# Patient Record
Sex: FEMALE | Race: WHITE | HISPANIC OR LATINO | Employment: FULL TIME | URBAN - METROPOLITAN AREA
[De-identification: names, ages, dates, MRNs, and addresses within clinical notes are randomized per-mention and may not be internally consistent; named-entity substitution may affect disease eponyms.]

---

## 2020-09-29 ENCOUNTER — TELEPHONE (OUTPATIENT)
Dept: SCHEDULING | Facility: IMAGING CENTER | Age: 25
End: 2020-09-29

## 2020-10-07 ENCOUNTER — OFFICE VISIT (OUTPATIENT)
Dept: MEDICAL GROUP | Facility: IMAGING CENTER | Age: 25
End: 2020-10-07
Payer: COMMERCIAL

## 2020-10-07 VITALS
SYSTOLIC BLOOD PRESSURE: 116 MMHG | DIASTOLIC BLOOD PRESSURE: 72 MMHG | TEMPERATURE: 98.3 F | WEIGHT: 153 LBS | OXYGEN SATURATION: 98 % | HEART RATE: 78 BPM | BODY MASS INDEX: 24.59 KG/M2 | RESPIRATION RATE: 12 BRPM | HEIGHT: 66 IN

## 2020-10-07 DIAGNOSIS — Z30.431 ENCOUNTER FOR ROUTINE CHECKING OF INTRAUTERINE CONTRACEPTIVE DEVICE (IUD): ICD-10-CM

## 2020-10-07 DIAGNOSIS — L65.9 HAIR LOSS: ICD-10-CM

## 2020-10-07 DIAGNOSIS — Z13.220 SCREENING CHOLESTEROL LEVEL: ICD-10-CM

## 2020-10-07 PROCEDURE — 99204 OFFICE O/P NEW MOD 45 MIN: CPT | Performed by: FAMILY MEDICINE

## 2020-10-07 SDOH — HEALTH STABILITY: MENTAL HEALTH: HOW OFTEN DO YOU HAVE A DRINK CONTAINING ALCOHOL?: 2-4 TIMES A MONTH

## 2020-10-07 ASSESSMENT — ENCOUNTER SYMPTOMS
WHEEZING: 0
PALPITATIONS: 0
VOMITING: 0
ABDOMINAL PAIN: 0
MYALGIAS: 0
NAUSEA: 0
COUGH: 0
DIARRHEA: 0
SHORTNESS OF BREATH: 0
EYE PAIN: 0
HEADACHES: 0
DIZZINESS: 0
DOUBLE VISION: 0
FEVER: 0
CHILLS: 0

## 2020-10-07 ASSESSMENT — PATIENT HEALTH QUESTIONNAIRE - PHQ9: CLINICAL INTERPRETATION OF PHQ2 SCORE: 0

## 2020-10-07 ASSESSMENT — PAIN SCALES - GENERAL: PAINLEVEL: NO PAIN

## 2020-10-07 NOTE — PROGRESS NOTES
"Chief Complaint   Patient presents with   • Establish Care   • Hair Loss     over many years     HPI:  25 year old here to discuss hair loss over many years since about 19 years old. She stopped birth control and has an iud. Feels that it is hormonal.   Has been to a dermatologist. Who said that she was in a normal cycle of hair loss though is concerned because she keeps loosing hair. She says in the shower and when driving she notices most. She has kept her hair long half way down the back for years.    Thyroid disorder in mom. No surgeries. No family history of hair thinning.   She has heavy periods. Last 4-5 days with two heavy days. She had awa on birth control oral since she was 13 years old. Then switched to a different one at 19 years old. Then stopped birth control at 23 years old. She was told in [place] that she is not ovulating. She was not concerned at the time because she does not want to have kids. No fevers chills. Or pulling the hair out.   No Known Allergies  No current outpatient medications on file.     No current facility-administered medications for this visit.      Social History     Tobacco Use   • Smoking status: Never Smoker   • Smokeless tobacco: Never Used   Substance Use Topics   • Alcohol use: Yes     Frequency: 2-4 times a month   • Drug use: Never     Family History   Problem Relation Age of Onset   • Thyroid Mother    • Heart Disease Mother    • Hypertension Father    • Diabetes Father        /72   Pulse 78   Temp 36.8 °C (98.3 °F)   Resp 12   Ht 1.68 m (5' 6.14\")   Wt 69.4 kg (153 lb)   SpO2 98%  Body mass index is 24.59 kg/m².  Review of Systems   Constitutional: Negative for chills, fever and malaise/fatigue.   HENT: Negative for hearing loss and tinnitus.    Eyes: Negative for double vision and pain.   Respiratory: Negative for cough, shortness of breath and wheezing.    Cardiovascular: Negative for chest pain, palpitations and leg swelling.   Gastrointestinal: Negative " for abdominal pain, diarrhea, nausea and vomiting.   Genitourinary: Negative for dysuria and frequency.   Musculoskeletal: Negative for joint pain and myalgias.   Skin: Negative for itching and rash.   Neurological: Negative for dizziness and headaches.     Physical Exam   Constitutional: She is oriented to person, place, and time and well-developed, well-nourished, and in no distress. No distress.   HENT:   Head: Normocephalic and atraumatic.   Hair and scalp exam reveals thick hair no lesions    Eyes: Pupils are equal, round, and reactive to light. Conjunctivae are normal. Right eye exhibits no discharge. Left eye exhibits no discharge. No scleral icterus.   Cardiovascular: Normal rate, regular rhythm and normal heart sounds. Exam reveals no gallop and no friction rub.   No murmur heard.  Pulmonary/Chest: Effort normal and breath sounds normal. No respiratory distress. She has no wheezes. She has no rales.   Musculoskeletal:         General: No edema.   Neurological: She is alert and oriented to person, place, and time.   Skin: Skin is warm and dry. She is not diaphoretic.         All labs (last 1 month):   No visits with results within 1 Month(s) from this visit.   Latest known visit with results is:   No results found for any previous visit.       Lipids: No results found for: CHOLSTRLTOT, TRIGLYCERIDE, HDL, LDL    Imaging: No results found.    A/P  I believe she was on a birth control with higher estrogen thus making her hair thicker at one point which naturally thinned back to its original thickness which is still thick. Then she became hyper focused on hair loss noticing what sounds like a normal amount of hair loss for a women with long hair. Reassurance provided.   Return for annual.    Problem List Items Addressed This Visit     None      Visit Diagnoses     Hair loss        Relevant Orders    CBC WITH DIFFERENTIAL    Comp Metabolic Panel    TSH WITH REFLEX TO FT4    Screening cholesterol level         Relevant Orders    Lipid Profile    Encounter for routine checking of intrauterine contraceptive device (IUD)        Relevant Orders    REFERRAL TO OB/GYN          Portions of this note may be dictated using Dragon NaturallySpeaking voice recognition software.  Variances in spelling and vocabulary are possible and unintentional.  Not all areas may be caught/corrected.  Please notify me if any discrepancies are noted or if the meaning of any statement is not correct/clear.

## 2020-10-19 ENCOUNTER — OFFICE VISIT (OUTPATIENT)
Dept: MEDICAL GROUP | Facility: IMAGING CENTER | Age: 25
End: 2020-10-19
Payer: COMMERCIAL

## 2020-10-19 VITALS
DIASTOLIC BLOOD PRESSURE: 60 MMHG | HEIGHT: 66 IN | BODY MASS INDEX: 24.59 KG/M2 | OXYGEN SATURATION: 99 % | SYSTOLIC BLOOD PRESSURE: 108 MMHG | HEART RATE: 78 BPM | WEIGHT: 153 LBS | RESPIRATION RATE: 12 BRPM | TEMPERATURE: 99 F

## 2020-10-19 DIAGNOSIS — Z20.2 STD EXPOSURE: ICD-10-CM

## 2020-10-19 DIAGNOSIS — H60.62 CHRONIC OTITIS EXTERNA OF LEFT EAR, UNSPECIFIED TYPE: ICD-10-CM

## 2020-10-19 DIAGNOSIS — Z00.00 ANNUAL PHYSICAL EXAM: ICD-10-CM

## 2020-10-19 PROCEDURE — 99395 PREV VISIT EST AGE 18-39: CPT | Performed by: FAMILY MEDICINE

## 2020-10-19 RX ORDER — CIPROFLOXACIN AND DEXAMETHASONE 3; 1 MG/ML; MG/ML
4 SUSPENSION/ DROPS AURICULAR (OTIC) 2 TIMES DAILY
Qty: 4 ML | Refills: 0 | Status: SHIPPED | OUTPATIENT
Start: 2020-10-19 | End: 2020-10-26

## 2020-10-19 ASSESSMENT — PAIN SCALES - GENERAL: PAINLEVEL: NO PAIN

## 2020-10-19 NOTE — PROGRESS NOTES
Subjective:     CC:   Chief Complaint   Patient presents with   • Annual Exam       HPI:   Katelyn Ling is a 25 y.o. female who presents for annual exam. She is feeling well and denies any complaints.    Ob-Gyn/ History:    Patient has GYN provider: yes    Health Maintenance  Diet: healthy  Exercise: active  Substance Abuse: no ilicit drinks 2 times per month two glasses each time  No concerning moles    Cancer screening  Colorectal Cancer Screening: none in family  Lung Cancer Screening: none in family  Breast Cancer Screening: 3 cousins of her mom with breast cancer in their 30s or 40s.  Pt prefers to do monthly at home exams. Discussed how to do this. What to look for and how to differentiate a mass from normal breast tissue. She will do breast and pelvic exam with her gyn    Infectious disease screening/Immunizations  -she would like to get tested for stds  --Immunizations:    She says she is up to date mostly but refuses flu    She  has no past medical history on file.  She  has no past surgical history on file.    Family History   Problem Relation Age of Onset   • Thyroid Mother    • Heart Disease Mother    • Hypertension Father    • Diabetes Father        Social History     Socioeconomic History   • Marital status:      Spouse name: Not on file   • Number of children: Not on file   • Years of education: Not on file   • Highest education level: Not on file   Occupational History   • Not on file   Social Needs   • Financial resource strain: Not on file   • Food insecurity     Worry: Not on file     Inability: Not on file   • Transportation needs     Medical: Not on file     Non-medical: Not on file   Tobacco Use   • Smoking status: Never Smoker   • Smokeless tobacco: Never Used   Substance and Sexual Activity   • Alcohol use: Yes     Frequency: 2-4 times a month   • Drug use: Never   • Sexual activity: Yes     Partners: Male     Birth control/protection: I.U.D.   Lifestyle   • Physical activity      "Days per week: Not on file     Minutes per session: Not on file   • Stress: Not on file   Relationships   • Social connections     Talks on phone: Not on file     Gets together: Not on file     Attends Hinduism service: Not on file     Active member of club or organization: Not on file     Attends meetings of clubs or organizations: Not on file     Relationship status: Not on file   • Intimate partner violence     Fear of current or ex partner: Not on file     Emotionally abused: Not on file     Physically abused: Not on file     Forced sexual activity: Not on file   Other Topics Concern   • Not on file   Social History Narrative   • Not on file       There are no active problems to display for this patient.        No current outpatient medications on file.     No current facility-administered medications for this visit.      No Known Allergies    Review of Systems   Constitutional: Negative for fever, chills, unexplained weight loss, night sweats  HENT: Negative for congestion.    Eyes: Negative for pain or sudden vision changes   Respiratory: Negative for cough and shortness of breath.    Cardiovascular: Negative for leg swelling or chest pain  Gastrointestinal: Negative for nausea, vomiting, abdominal pain and diarrhea.   Genitourinary: Negative for dysuria and hematuria.   Skin: Negative for rash or concerning moles   Neurological: Negative for dizziness, focal weakness and headaches.   Psychiatric/Behavioral: Negative for depression.  The patient is not nervous/anxious.      Objective:     /60   Pulse 78   Temp 37.2 °C (99 °F)   Resp 12   Ht 1.68 m (5' 6.14\")   Wt 69.4 kg (153 lb)   LMP 09/25/2020 (Exact Date) Comment: IUD  SpO2 99%   BMI 24.59 kg/m²   Body mass index is 24.59 kg/m².  Wt Readings from Last 4 Encounters:   10/19/20 69.4 kg (153 lb)   10/07/20 69.4 kg (153 lb)       Physical Exam:  Constitutional: Well-developed and well-nourished. Not diaphoretic. No distress.   Skin: Skin is warm " and dry. No rash noted.  Head: Atraumatic without lesions. Hair pull test is negative. Scalp with mild flaking. No erythema or lesions. No adenopathy of the head or neck. Thyroid without nodules and normal size.   Eyes: Conjunctivae and extraocular motions are normal. Pupils are equal, round, and reactive to light and accomodation. No scleral icterus.   Ears:  External ears unremarkable b/l. Tympanic membranes clear and intact b/l. There is a very short hair in right ear canal (patient says she can feel this). Left ear canal without discharge though the skin at the entrance is red and tender.   Mouth/Throat: deferred during covid 19 pandemic asked patient if any concerns lesions or questions  Neck: Supple, trachea midline. Normal range of motion. No thyromegaly present. No lymphadenopathy--cervical or supraclavicular.  Cardiovascular: Regular rate and rhythm, S1 and S2 without murmur, rubs, or gallops.  Lungs: Normal inspiratory effort, CTA bilaterally, no wheezes/rhonchi/rales  Breast: defers to gyn  Abdomen: Soft, non tender, and without distention. Active normal bowel sounds. No rebound, guarding, masses or HSM.  : defer to gyn  Extremities: No cyanosis, clubbing, erythema, nor edema. Distal pulses intact and symmetric.   Musculoskeletal: All major joints AROM full in all directions without pain.  Neurological: Alert and oriented x 3.  Psychiatric:  Behavior, mood, and affect are appropriate.      Assessment and Plan:   -annual Labs ordered at prior visit.   -Possible that she can feel the hair in left ear canal though also her cerumen is soft and liquid like which I believe can cause a tickle sensation in the ear as well.   -right ear is tender with very mild erythema just at the entrance of the ear canal with no discharge or other changes.     No problems updated.  Problem List Items Addressed This Visit     None      Visit Diagnoses     STD exposure        Relevant Orders    Chlamydia/GC PCR Urine Or Swab     HEP C VIRUS ANTIBODY    HIV AG/AB COMBO ASSAY SCREENING    T.PALLIDUM AB EIA    Chronic otitis externa of left ear, unspecified type        Relevant Medications    ciprofloxacin/dexamethasone (CIPRODEX) 0.3-0.1 % Suspension    Annual physical exam              Follow-up: PRN concerns and for annual screenings once per year    -Smoking cessation discussed if smoking and encouraged to come to office if quit and tempted or restarts smoking if pertinent to this patient. We discourage use of electronic smoking devises.   -Discussed healthy drinking habits if over 7 for females, 14 for males per week or more than 4 in one day.  -Discussed healthy eating habits, exercise, being physically active, healthy bmi below 25  -patient to provide ages of family members when they were diagnosed with cancer , especially breast and ovarian, pancreatic cancers.  -Reviewed pap history in female patients, if they have a gynecologist they are encouraged to follow up with that doctor for annual pelvic and breast exams. If they prefer to see me for women's health, I will perform pap smear with hpv dna testing, pelvic, and breast exam, these and male genital exams are always done in the presence of a medical assistant and with verbal permission from the patient.   -Colonoscopy history reviewed with those over 46yo or those with early family h/o colon cancer. If patient is due we provide them with various colon cancer screen modalities and relevant information to help the patient decide which is best for them.   -Mammograms recommended yearly for women over 41yo. Risks and benefits are reviewed and discussed with the patient and mammogram script provided.   -PSA discussed with males with family history of prostate cancer or those concerned. The decision to order this test is made with the patient.   -If patient prescribed medicines then told to review package insert for any warnings, side effects, contra-indications and medication vs  medication reactions.   -STD testing added to lab work due to patients age per guideline recommendations  -Patients screened for anxiety and depression. If positive screening patients are offered behavioral health services, medications, and tools to improve mood.   -to improve bone health take calcium and vitamin D, perform weight-bearing exercise, in addition we can discuss additional medications if needed including bisphosphonates, parathyroid hormone, and raloxifene.  Esophageal irritation can occur with bisphosphonate therapy this can be reduced by not laying down for 30 min after taking and taking with a full glass of water  -if wearing nail polish on toes or hands asked to rto if there are any dark brown or black areas under the nails  If lab tests ordered, then patient instructed to go to lab/location/plan  If imaging tests ordered, then patient instructed to go to radiology/location/plan  If medicines ordered, then patient instructed to go to pharmacy/location/plan  Health maintenance I reviewed both men and women's health maintenance  Leading causes of death are motor vehicle accidents, cardiovascular disease, malignant tumors, and HIV.   Breast and ovarian cancer mutation screening was suggested if there was an increased risk for the patient based on jay scoring.   -A general visit to see the eye doctor every one to two years was thought appropriate. Dentist ever 6-12 months.  -Immunization suggestions: Tetanus shot every 10 years, Influenza immunization pneumococcal- anyone with chronic illnesses   No

## 2020-11-21 LAB
ALBUMIN SERPL-MCNC: 5.1 G/DL (ref 3.9–5)
ALBUMIN/GLOB SERPL: 2.1 {RATIO} (ref 1.2–2.2)
ALP SERPL-CCNC: 57 IU/L (ref 39–117)
ALT SERPL-CCNC: 13 IU/L (ref 0–32)
AST SERPL-CCNC: 16 IU/L (ref 0–40)
BASOPHILS # BLD AUTO: 0.1 X10E3/UL (ref 0–0.2)
BASOPHILS NFR BLD AUTO: 1 %
BILIRUB SERPL-MCNC: 2.3 MG/DL (ref 0–1.2)
BUN SERPL-MCNC: 11 MG/DL (ref 6–20)
BUN/CREAT SERPL: 14 (ref 9–23)
C TRACH RRNA SPEC QL NAA+PROBE: NEGATIVE
CALCIUM SERPL-MCNC: 9.8 MG/DL (ref 8.7–10.2)
CHLORIDE SERPL-SCNC: 103 MMOL/L (ref 96–106)
CHOLEST SERPL-MCNC: 189 MG/DL (ref 100–199)
CO2 SERPL-SCNC: 20 MMOL/L (ref 20–29)
CREAT SERPL-MCNC: 0.78 MG/DL (ref 0.57–1)
EOSINOPHIL # BLD AUTO: 0.1 X10E3/UL (ref 0–0.4)
EOSINOPHIL NFR BLD AUTO: 1 %
ERYTHROCYTE [DISTWIDTH] IN BLOOD BY AUTOMATED COUNT: 11.7 % (ref 11.7–15.4)
GLOBULIN SER CALC-MCNC: 2.4 G/DL (ref 1.5–4.5)
GLUCOSE SERPL-MCNC: 97 MG/DL (ref 65–99)
HCT VFR BLD AUTO: 42.8 % (ref 34–46.6)
HCV AB S/CO SERPL IA: <0.1 S/CO RATIO (ref 0–0.9)
HDLC SERPL-MCNC: 66 MG/DL
HGB BLD-MCNC: 14.1 G/DL (ref 11.1–15.9)
HIV 1+2 AB+HIV1 P24 AG SERPL QL IA: NON REACTIVE
IMM GRANULOCYTES # BLD AUTO: 0 X10E3/UL (ref 0–0.1)
IMM GRANULOCYTES NFR BLD AUTO: 0 %
IMMATURE CELLS  115398: NORMAL
LABORATORY COMMENT REPORT: ABNORMAL
LDLC SERPL CALC-MCNC: 113 MG/DL (ref 0–99)
LYMPHOCYTES # BLD AUTO: 1.7 X10E3/UL (ref 0.7–3.1)
LYMPHOCYTES NFR BLD AUTO: 23 %
MCH RBC QN AUTO: 31.3 PG (ref 26.6–33)
MCHC RBC AUTO-ENTMCNC: 32.9 G/DL (ref 31.5–35.7)
MCV RBC AUTO: 95 FL (ref 79–97)
MONOCYTES # BLD AUTO: 0.6 X10E3/UL (ref 0.1–0.9)
MONOCYTES NFR BLD AUTO: 8 %
MORPHOLOGY BLD-IMP: NORMAL
N GONORRHOEA RRNA SPEC QL NAA+PROBE: NEGATIVE
NEUTROPHILS # BLD AUTO: 5 X10E3/UL (ref 1.4–7)
NEUTROPHILS NFR BLD AUTO: 67 %
NRBC BLD AUTO-RTO: NORMAL %
PLATELET # BLD AUTO: 353 X10E3/UL (ref 150–450)
POTASSIUM SERPL-SCNC: 4 MMOL/L (ref 3.5–5.2)
PROT SERPL-MCNC: 7.5 G/DL (ref 6–8.5)
RBC # BLD AUTO: 4.5 X10E6/UL (ref 3.77–5.28)
SODIUM SERPL-SCNC: 139 MMOL/L (ref 134–144)
T4 FREE SERPL-MCNC: 1.43 NG/DL (ref 0.82–1.77)
TREPONEMA PALLIDUM IGG+IGM AB [PRESENCE] IN SERUM OR PLASMA BY IMMUNOASSAY: NON REACTIVE
TRIGL SERPL-MCNC: 52 MG/DL (ref 0–149)
TSH SERPL DL<=0.005 MIU/L-ACNC: 4.65 UIU/ML (ref 0.45–4.5)
VLDLC SERPL CALC-MCNC: 10 MG/DL (ref 5–40)
WBC # BLD AUTO: 7.5 X10E3/UL (ref 3.4–10.8)

## 2020-11-30 ENCOUNTER — TELEMEDICINE (OUTPATIENT)
Dept: MEDICAL GROUP | Facility: IMAGING CENTER | Age: 25
End: 2020-11-30
Payer: COMMERCIAL

## 2020-11-30 VITALS — BODY MASS INDEX: 24.59 KG/M2 | HEIGHT: 66 IN | WEIGHT: 153 LBS

## 2020-11-30 DIAGNOSIS — E80.4 GILBERT'S DISEASE: ICD-10-CM

## 2020-11-30 DIAGNOSIS — E03.9 ACQUIRED HYPOTHYROIDISM: ICD-10-CM

## 2020-11-30 PROCEDURE — 99213 OFFICE O/P EST LOW 20 MIN: CPT | Mod: 95,CR | Performed by: FAMILY MEDICINE

## 2020-11-30 RX ORDER — LEVOTHYROXINE SODIUM 0.03 MG/1
TABLET ORAL
Qty: 36 TAB | Refills: 0 | Status: SHIPPED | OUTPATIENT
Start: 2020-11-30 | End: 2022-05-23

## 2020-11-30 ASSESSMENT — FIBROSIS 4 INDEX: FIB4 SCORE: 0.34

## 2020-11-30 NOTE — PROGRESS NOTES
Telemedicine Visit: New Patient     This encounter was conducted via Zoom.   Verbal consent was obtained. Patient's identity was verified. Patient aware this will be   billed the same as an in person evaluation.  Patient in home, I am in office at [address] [place] [address]  Subjective:     Chief Complaint   Patient presents with   • Lab Results       Katelyn Ling is a 25 y.o. female with history of no chronic medical conditions on virtual visit to discuss lab results. Mother has thyroid disease. Father with diabetes and hypertension. Patient denies significant changes in skin nails or periods. Was having daily bm now bm every other. No significant change in energy. She is still concerned about her hair falling out. She gerth of the pony tail is about the same. Mat grandmother also has thryoid issues. No fevers or chills.     ROS  See HPI  Constitutional: Negative for fever, chills and malaise/fatigue.   HENT: Negative for congestion, itchy watery eyes  Eyes: Negative for pain or sudden changes in vision  Respiratory: Negative for cough and shortness of breath.    Cardiovascular: Negative for leg swelling or chest pain  Gastrointestinal: Negative for nausea, vomiting, abdominal pain and diarrhea.   Genitourinary: Negative for dysuria and hematuria.   Skin: Negative for rash or concerning moles   Neurological: Negative for dizziness, focal weakness   Psychiatric/Behavioral: Negative for depression.  The patient is not nervous/anxious.    Musculoskeletal: no weakness or joint stiffness    No Known Allergies    Current medicines (including changes today)  Current Outpatient Medications   Medication Sig Dispense Refill   • levothyroxine (SYNTHROID) 25 MCG Tab Take one tab in the morning on an empty stomach Mondays, Wednesdays, Fridays 36 Tab 0     No current facility-administered medications for this visit.        She  has no past medical history on file.  She  has no past surgical history on file.      Family  "History   Problem Relation Age of Onset   • Thyroid Mother    • Heart Disease Mother    • Hypertension Father    • Diabetes Father      Family Status   Relation Name Status   • Mo  (Not Specified)   • Fa  (Not Specified)       Patient Active Problem List    Diagnosis Date Noted   • Gilbert's disease 11/30/2020   • Acquired hypothyroidism 11/30/2020          Objective:   Vitals obtained by patient:  Ht 1.68 m (5' 6.14\")   Wt 69.4 kg (153 lb)   LMP 11/26/2020   BMI 24.59 kg/m²     Physical Exam:  Constitutional: Alert, no distress, well-groomed.  Skin: No rashes in visible areas.  Eye: Round. Conjunctiva clear, lids normal. No icterus.   ENMT: Lips pink without lesions, good dentition, moist mucous membranes. Phonation normal.  Neck: No masses, no thyromegaly. Moves freely without pain.  CV: Pulse as reported by patient  Respiratory: Unlabored respiratory effort, no cough or audible wheeze  Psych: Alert and oriented x3, normal affect and mood.   Neuro: symmetric face. Alert and oriented. Follows commands. No aphasia or dysarthria.    Labs:  Orders Only on 11/19/2020   Component Date Value Ref Range Status   • WBC 11/19/2020 7.5  3.4 - 10.8 x10E3/uL Final   • RBC 11/19/2020 4.50  3.77 - 5.28 x10E6/uL Final   • Hemoglobin 11/19/2020 14.1  11.1 - 15.9 g/dL Final   • Hematocrit 11/19/2020 42.8  34.0 - 46.6 % Final   • MCV 11/19/2020 95  79 - 97 fL Final   • MCH 11/19/2020 31.3  26.6 - 33.0 pg Final   • MCHC 11/19/2020 32.9  31.5 - 35.7 g/dL Final   • RDW 11/19/2020 11.7  11.7 - 15.4 % Final   • Platelet Count 11/19/2020 353  150 - 450 x10E3/uL Final   • Neutrophils-Polys 11/19/2020 67  Not Estab. % Final   • Lymphocytes 11/19/2020 23  Not Estab. % Final   • Monocytes 11/19/2020 8  Not Estab. % Final   • Eosinophils 11/19/2020 1  Not Estab. % Final   • Basophils 11/19/2020 1  Not Estab. % Final   • Immature Cells 11/19/2020 CANCELED   Final    Result canceled by the ancillary.   • Neutrophils (Absolute) 11/19/2020 5.0  " 1.4 - 7.0 x10E3/uL Final   • Lymphs (Absolute) 11/19/2020 1.7  0.7 - 3.1 x10E3/uL Final   • Monos (Absolute) 11/19/2020 0.6  0.1 - 0.9 x10E3/uL Final   • Eos (Absolute) 11/19/2020 0.1  0.0 - 0.4 x10E3/uL Final   • Baso (Absolute) 11/19/2020 0.1  0.0 - 0.2 x10E3/uL Final   • Immature Granulocytes 11/19/2020 0  Not Estab. % Final   • Immature Granulocytes (abs) 11/19/2020 0.0  0.0 - 0.1 x10E3/uL Final   • Nucleated RBC 11/19/2020 CANCELED   Final    Result canceled by the ancillary.   • Comments-Diff 11/19/2020 CANCELED   Final    Result canceled by the ancillary.   • Glucose 11/19/2020 97  65 - 99 mg/dL Final   • Bun 11/19/2020 11  6 - 20 mg/dL Final   • Creatinine 11/19/2020 0.78  0.57 - 1.00 mg/dL Final   • GFR If Non  11/19/2020 105  >59 mL/min/1.73 Final   • GFR If  11/19/2020 120  >59 mL/min/1.73 Final   • Bun-Creatinine Ratio 11/19/2020 14  9 - 23 Final   • Sodium 11/19/2020 139  134 - 144 mmol/L Final   • Potassium 11/19/2020 4.0  3.5 - 5.2 mmol/L Final   • Chloride 11/19/2020 103  96 - 106 mmol/L Final   • Co2 11/19/2020 20  20 - 29 mmol/L Final   • Calcium 11/19/2020 9.8  8.7 - 10.2 mg/dL Final   • Total Protein 11/19/2020 7.5  6.0 - 8.5 g/dL Final   • Albumin 11/19/2020 5.1* 3.9 - 5.0 g/dL Final   • Globulin 11/19/2020 2.4  1.5 - 4.5 g/dL Final   • A-G Ratio 11/19/2020 2.1  1.2 - 2.2 Final   • Total Bilirubin 11/19/2020 2.3* 0.0 - 1.2 mg/dL Final   • Alkaline Phosphatase 11/19/2020 57  39 - 117 IU/L Final   • AST(SGOT) 11/19/2020 16  0 - 40 IU/L Final   • ALT(SGPT) 11/19/2020 13  0 - 32 IU/L Final   • Cholesterol,Tot 11/19/2020 189  100 - 199 mg/dL Final   • Triglycerides 11/19/2020 52  0 - 149 mg/dL Final   • HDL 11/19/2020 66  >39 mg/dL Final   • VLDL Cholesterol Calc 11/19/2020 10  5 - 40 mg/dL Final   • LDL Chol Calc ([facility]) 11/19/2020 113* 0 - 99 mg/dL Final   • Comment: 11/19/2020 CANCELED   Final    Result canceled by the ancillary.   • Chlamydia Trachomatis, MARTHA  11/19/2020 Negative  Negative Final   • N. Gonorrhoeae MARTHA 11/19/2020 Negative  Negative Final   • T. Pallidum Ab 11/19/2020 Non Reactive  Non Reactive Final   • HIV Screen 4th Gen. wRfx 11/19/2020 Non Reactive  Non Reactive Final   • Hepatitis C Antibody 11/19/2020 <0.1  0.0 - 0.9 s/co ratio Final    Comment: Negative:     < 0.8  Indeterminate: 0.8 - 0.9  Positive:     > 0.9  The CDC recommends that a positive HCV antibody result  be followed up with a HCV Nucleic Acid Amplification  test (904221).     • TSH 11/19/2020 4.650* 0.450 - 4.500 uIU/mL Final   • Free T-4 11/19/2020 1.43  0.82 - 1.77 ng/dL Final       Imaging:   No results found.    Assessment and Plan:   The following treatment plan was discussed:   -Discussed her TSH which is just barely over normal and the need to only treat for symptoms.  Given patient's ongoing concern about hair loss and her change in bowel habits she would like to start the medication.  Instructed to review package inserts.  Always take the medication by itself on an empty stomach in the morning.  We will start off very low-dose and monitor and she will repeat her labs in 2 months  -discussed benign nature of gilberts  Problem List Items Addressed This Visit     Gilbert's disease    Acquired hypothyroidism    Relevant Medications    levothyroxine (SYNTHROID) 25 MCG Tab    Other Relevant Orders    TSH    THYROID PEROX AB TPO (REFLEX)    FREE THYROXINE          Follow-up: Return if symptoms worsen or fail to improve.        Portions of this note may be dictated using Dragon NaturallySpeaDecurate voice recognition software.  Variances in spelling and vocabulary are possible and unintentional.  Not all areas may be caught/corrected.  Please notify me if any discrepancies are noted or if the meaning of any statement is not correct/clear.

## 2021-04-21 ENCOUNTER — IMMUNIZATION (OUTPATIENT)
Dept: FAMILY PLANNING/WOMEN'S HEALTH CLINIC | Facility: IMMUNIZATION CENTER | Age: 26
End: 2021-04-21
Payer: COMMERCIAL

## 2021-04-21 DIAGNOSIS — Z23 ENCOUNTER FOR VACCINATION: Primary | ICD-10-CM

## 2021-04-21 PROCEDURE — 91300 PFIZER SARS-COV-2 VACCINE: CPT

## 2021-04-21 PROCEDURE — 0001A PFIZER SARS-COV-2 VACCINE: CPT

## 2021-05-21 ENCOUNTER — IMMUNIZATION (OUTPATIENT)
Dept: FAMILY PLANNING/WOMEN'S HEALTH CLINIC | Facility: IMMUNIZATION CENTER | Age: 26
End: 2021-05-21
Payer: COMMERCIAL

## 2021-05-21 DIAGNOSIS — Z23 ENCOUNTER FOR VACCINATION: Primary | ICD-10-CM

## 2021-05-21 PROCEDURE — 0002A PFIZER SARS-COV-2 VACCINE: CPT

## 2021-05-21 PROCEDURE — 91300 PFIZER SARS-COV-2 VACCINE: CPT

## 2022-01-08 ENCOUNTER — PHARMACY VISIT (OUTPATIENT)
Dept: PHARMACY | Facility: MEDICAL CENTER | Age: 27
End: 2022-01-08
Payer: COMMERCIAL

## 2022-01-08 PROCEDURE — RXMED WILLOW AMBULATORY MEDICATION CHARGE: Performed by: INTERNAL MEDICINE

## 2022-01-08 RX ORDER — RNA INGREDIENT BNT-162B2 0.23 G/1.8ML
INJECTION, SUSPENSION INTRAMUSCULAR
Qty: 0.3 ML | Refills: 0 | Status: SHIPPED | OUTPATIENT
Start: 2022-01-08 | End: 2022-05-31

## 2022-05-23 ENCOUNTER — OFFICE VISIT (OUTPATIENT)
Dept: MEDICAL GROUP | Facility: IMAGING CENTER | Age: 27
End: 2022-05-23
Payer: COMMERCIAL

## 2022-05-23 VITALS
TEMPERATURE: 99.3 F | BODY MASS INDEX: 26.23 KG/M2 | OXYGEN SATURATION: 96 % | HEIGHT: 66 IN | DIASTOLIC BLOOD PRESSURE: 62 MMHG | SYSTOLIC BLOOD PRESSURE: 100 MMHG | WEIGHT: 163.2 LBS | HEART RATE: 73 BPM

## 2022-05-23 DIAGNOSIS — R79.89 ELEVATED TSH: ICD-10-CM

## 2022-05-23 DIAGNOSIS — Z13.0 SCREENING FOR DEFICIENCY ANEMIA: ICD-10-CM

## 2022-05-23 DIAGNOSIS — Z13.220 SCREENING FOR HYPERLIPIDEMIA: ICD-10-CM

## 2022-05-23 DIAGNOSIS — Z13.1 SCREENING FOR DIABETES MELLITUS (DM): ICD-10-CM

## 2022-05-23 PROCEDURE — 99395 PREV VISIT EST AGE 18-39: CPT | Performed by: FAMILY MEDICINE

## 2022-05-23 SDOH — ECONOMIC STABILITY: TRANSPORTATION INSECURITY
IN THE PAST 12 MONTHS, HAS THE LACK OF TRANSPORTATION KEPT YOU FROM MEDICAL APPOINTMENTS OR FROM GETTING MEDICATIONS?: NO

## 2022-05-23 SDOH — ECONOMIC STABILITY: FOOD INSECURITY: WITHIN THE PAST 12 MONTHS, THE FOOD YOU BOUGHT JUST DIDN'T LAST AND YOU DIDN'T HAVE MONEY TO GET MORE.: NEVER TRUE

## 2022-05-23 SDOH — ECONOMIC STABILITY: TRANSPORTATION INSECURITY
IN THE PAST 12 MONTHS, HAS LACK OF RELIABLE TRANSPORTATION KEPT YOU FROM MEDICAL APPOINTMENTS, MEETINGS, WORK OR FROM GETTING THINGS NEEDED FOR DAILY LIVING?: NO

## 2022-05-23 SDOH — HEALTH STABILITY: PHYSICAL HEALTH: ON AVERAGE, HOW MANY MINUTES DO YOU ENGAGE IN EXERCISE AT THIS LEVEL?: 60 MIN

## 2022-05-23 SDOH — ECONOMIC STABILITY: INCOME INSECURITY: IN THE LAST 12 MONTHS, WAS THERE A TIME WHEN YOU WERE NOT ABLE TO PAY THE MORTGAGE OR RENT ON TIME?: NO

## 2022-05-23 SDOH — ECONOMIC STABILITY: HOUSING INSECURITY
IN THE LAST 12 MONTHS, WAS THERE A TIME WHEN YOU DID NOT HAVE A STEADY PLACE TO SLEEP OR SLEPT IN A SHELTER (INCLUDING NOW)?: NO

## 2022-05-23 SDOH — ECONOMIC STABILITY: INCOME INSECURITY: HOW HARD IS IT FOR YOU TO PAY FOR THE VERY BASICS LIKE FOOD, HOUSING, MEDICAL CARE, AND HEATING?: NOT VERY HARD

## 2022-05-23 SDOH — ECONOMIC STABILITY: FOOD INSECURITY: WITHIN THE PAST 12 MONTHS, YOU WORRIED THAT YOUR FOOD WOULD RUN OUT BEFORE YOU GOT MONEY TO BUY MORE.: NEVER TRUE

## 2022-05-23 SDOH — ECONOMIC STABILITY: TRANSPORTATION INSECURITY
IN THE PAST 12 MONTHS, HAS LACK OF TRANSPORTATION KEPT YOU FROM MEETINGS, WORK, OR FROM GETTING THINGS NEEDED FOR DAILY LIVING?: NO

## 2022-05-23 SDOH — ECONOMIC STABILITY: HOUSING INSECURITY: IN THE LAST 12 MONTHS, HOW MANY PLACES HAVE YOU LIVED?: 1

## 2022-05-23 SDOH — HEALTH STABILITY: PHYSICAL HEALTH: ON AVERAGE, HOW MANY DAYS PER WEEK DO YOU ENGAGE IN MODERATE TO STRENUOUS EXERCISE (LIKE A BRISK WALK)?: 3 DAYS

## 2022-05-23 SDOH — HEALTH STABILITY: MENTAL HEALTH
STRESS IS WHEN SOMEONE FEELS TENSE, NERVOUS, ANXIOUS, OR CAN'T SLEEP AT NIGHT BECAUSE THEIR MIND IS TROUBLED. HOW STRESSED ARE YOU?: TO SOME EXTENT

## 2022-05-23 ASSESSMENT — SOCIAL DETERMINANTS OF HEALTH (SDOH)
IN A TYPICAL WEEK, HOW MANY TIMES DO YOU TALK ON THE PHONE WITH FAMILY, FRIENDS, OR NEIGHBORS?: THREE TIMES A WEEK
DO YOU BELONG TO ANY CLUBS OR ORGANIZATIONS SUCH AS CHURCH GROUPS UNIONS, FRATERNAL OR ATHLETIC GROUPS, OR SCHOOL GROUPS?: YES
HOW OFTEN DO YOU ATTEND CHURCH OR RELIGIOUS SERVICES?: MORE THAN 4 TIMES PER YEAR
HOW OFTEN DO YOU HAVE A DRINK CONTAINING ALCOHOL: 2-4 TIMES A MONTH
HOW MANY DRINKS CONTAINING ALCOHOL DO YOU HAVE ON A TYPICAL DAY WHEN YOU ARE DRINKING: 1 OR 2
IN A TYPICAL WEEK, HOW MANY TIMES DO YOU TALK ON THE PHONE WITH FAMILY, FRIENDS, OR NEIGHBORS?: THREE TIMES A WEEK
HOW OFTEN DO YOU GET TOGETHER WITH FRIENDS OR RELATIVES?: ONCE A WEEK
DO YOU BELONG TO ANY CLUBS OR ORGANIZATIONS SUCH AS CHURCH GROUPS UNIONS, FRATERNAL OR ATHLETIC GROUPS, OR SCHOOL GROUPS?: YES
HOW OFTEN DO YOU GET TOGETHER WITH FRIENDS OR RELATIVES?: ONCE A WEEK
HOW OFTEN DO YOU ATTENT MEETINGS OF THE CLUB OR ORGANIZATION YOU BELONG TO?: MORE THAN 4 TIMES PER YEAR
WITHIN THE PAST 12 MONTHS, YOU WORRIED THAT YOUR FOOD WOULD RUN OUT BEFORE YOU GOT THE MONEY TO BUY MORE: NEVER TRUE
HOW OFTEN DO YOU ATTEND CHURCH OR RELIGIOUS SERVICES?: MORE THAN 4 TIMES PER YEAR
HOW OFTEN DO YOU HAVE SIX OR MORE DRINKS ON ONE OCCASION: NEVER
HOW OFTEN DO YOU ATTENT MEETINGS OF THE CLUB OR ORGANIZATION YOU BELONG TO?: MORE THAN 4 TIMES PER YEAR
HOW HARD IS IT FOR YOU TO PAY FOR THE VERY BASICS LIKE FOOD, HOUSING, MEDICAL CARE, AND HEATING?: NOT VERY HARD

## 2022-05-23 ASSESSMENT — LIFESTYLE VARIABLES
HOW OFTEN DO YOU HAVE A DRINK CONTAINING ALCOHOL: 2-4 TIMES A MONTH
AUDIT-C TOTAL SCORE: 2
HOW MANY STANDARD DRINKS CONTAINING ALCOHOL DO YOU HAVE ON A TYPICAL DAY: 1 OR 2
SKIP TO QUESTIONS 9-10: 1
HOW OFTEN DO YOU HAVE SIX OR MORE DRINKS ON ONE OCCASION: NEVER

## 2022-05-23 ASSESSMENT — PATIENT HEALTH QUESTIONNAIRE - PHQ9: CLINICAL INTERPRETATION OF PHQ2 SCORE: 0

## 2022-05-23 ASSESSMENT — FIBROSIS 4 INDEX: FIB4 SCORE: 0.36

## 2022-05-23 NOTE — PROGRESS NOTES
Subjective:     CC:   Chief Complaint   Patient presents with   • Annual Exam     Requesting thyroid labs    • Medication Refill     Levothyroxine   • Nail Problem     Poss fungus, requesting a prescription       HPI:   Katelyn Ling is a 27 y.o. female who presents for annual exam. She is feeling well and denies any complaints. She has a history of   Patient Active Problem List   Diagnosis   • Gilbert's disease   • Acquired hypothyroidism       Ob-Gyn/ History:    Patient has GYN provider: none  Last Pap Smear:  Normal 2020  Current Contraceptive Method:has copper iud  Interested in STI screening less than 23yo or at risk behavior: no  Safe in relationship. yes    Health Maintenance  Aspirin and statin therapy: The ASCVD Risk score (Singh MADDISON Jr, et al., 2013) failed to calculate.  Diet: healthy  Exercise: active  Substance Abuse:etoh  Sun protection used.  Concerning moles: none    Cancer screening  Colorectal Cancer Screening: none in family  Lung Cancer Screening: never smoked  Breast Cancer Screening: no personal or family h/o breast, fallopian tube, ovarian, or pancreatic cancers.     Infectious disease screening/Immunizations  She thinks she got it within 10 years. tdap    She  has no past medical history on file.  She  has no past surgical history on file.    Family History   Problem Relation Age of Onset   • Thyroid Mother    • Heart Disease Mother    • Hypertension Father    • Diabetes Father        Social History     Socioeconomic History   • Marital status:      Spouse name: Not on file   • Number of children: Not on file   • Years of education: Not on file   • Highest education level: Bachelor's degree (e.g., BA, AB, BS)   Occupational History   • Not on file   Tobacco Use   • Smoking status: Never Smoker   • Smokeless tobacco: Never Used   Vaping Use   • Vaping Use: Never used   Substance and Sexual Activity   • Alcohol use: Yes   • Drug use: Never   • Sexual activity: Yes     Partners: Male      Birth control/protection: I.U.D.   Other Topics Concern   • Not on file   Social History Narrative   • Not on file     Social Determinants of Health     Financial Resource Strain: Low Risk    • Difficulty of Paying Living Expenses: Not very hard   Food Insecurity: No Food Insecurity   • Worried About Running Out of Food in the Last Year: Never true   • Ran Out of Food in the Last Year: Never true   Transportation Needs: No Transportation Needs   • Lack of Transportation (Medical): No   • Lack of Transportation (Non-Medical): No   Physical Activity: Sufficiently Active   • Days of Exercise per Week: 3 days   • Minutes of Exercise per Session: 60 min   Stress: Stress Concern Present   • Feeling of Stress : To some extent   Social Connections: Socially Integrated   • Frequency of Communication with Friends and Family: Three times a week   • Frequency of Social Gatherings with Friends and Family: Once a week   • Attends Sikhism Services: More than 4 times per year   • Active Member of Clubs or Organizations: Yes   • Attends Club or Organization Meetings: More than 4 times per year   • Marital Status:    Intimate Partner Violence: Not on file   Housing Stability: Low Risk    • Unable to Pay for Housing in the Last Year: No   • Number of Places Lived in the Last Year: 1   • Unstable Housing in the Last Year: No       Patient Active Problem List    Diagnosis Date Noted   • Gilbert's disease 11/30/2020   • Acquired hypothyroidism 11/30/2020         Current Outpatient Medications   Medication Sig Dispense Refill   • COVID-19 mRNA Vaccine, Pfizer, (PFIZ[employer] COVID-19 VACC) 30 MCG/0.3ML Suspension injection Inject  into the shoulder, thigh, or buttocks. (Patient not taking: Reported on 5/23/2022) 0.3 mL 0     No current facility-administered medications for this visit.     No Known Allergies    Review of Systems   Constitutional: Negative for fever, chills, unexplained weight loss, night sweats  HENT: Negative  "for congestion.    Eyes: Negative for pain or sudden vision changes   Respiratory: Negative for cough and shortness of breath.    Cardiovascular: Negative for leg swelling or chest pain  Gastrointestinal: Negative for nausea, vomiting, abdominal pain and diarrhea.   Genitourinary: Negative for dysuria and hematuria.   Skin: Negative for rash or concerning moles   Neurological: Negative for dizziness, focal weakness and headaches.   Psychiatric/Behavioral: Negative for depression.  The patient is not nervous/anxious.      Objective:     /62 (BP Location: Right arm, Patient Position: Sitting, BP Cuff Size: Adult)   Pulse 73   Temp 37.4 °C (99.3 °F) (Temporal)   Ht 1.676 m (5' 6\")   Wt 74 kg (163 lb 3.2 oz)   LMP 04/27/2022   SpO2 96%   Breastfeeding No   BMI 26.34 kg/m²   Body mass index is 26.34 kg/m².  Wt Readings from Last 4 Encounters:   05/23/22 74 kg (163 lb 3.2 oz)   11/30/20 69.4 kg (153 lb)   10/19/20 69.4 kg (153 lb)   10/07/20 69.4 kg (153 lb)       Physical Exam  Constitutional:       General: She is not in acute distress.     Appearance: She is not diaphoretic.   HENT:      Head: Normocephalic and atraumatic.   Eyes:      General: No scleral icterus.        Right eye: No discharge.         Left eye: No discharge.      Conjunctiva/sclera: Conjunctivae normal.      Pupils: Pupils are equal, round, and reactive to light.   Neck:      Thyroid: No thyromegaly.   Pulmonary:      Effort: Pulmonary effort is normal. No respiratory distress.   Abdominal:      General: There is no distension.   Skin:     General: Skin is warm and dry.   Neurological:      Mental Status: She is alert.   Psychiatric:         Mood and Affect: Affect normal.         Judgment: Judgment normal.           Assessment and Plan:   Was started on low dose synthroid 2 years ago. Stopped the medication  -Can trial 2000 units vitamin D daily.  Patient denies any symptoms of vitamin D deficiency such as depression bone " pain  -Patient had barely elevated TSH.  She had requested taking medication for this prior.  And had reported symptoms that she thought were associated with thyroid.  Though has been without symptoms since.  -She is scheduled with gynecologist for her routine women's health.  I do not have the results of her prior Pap smear so she can continue to that appointment.  Follow-up with me for Pap smears after that.  No problems updated.  Problem List Items Addressed This Visit    None     Visit Diagnoses     Screening for hyperlipidemia        Relevant Orders    LIPID PROFILE    Screening for diabetes mellitus (DM)        Relevant Orders    COMP METABOLIC PANEL    HEMOGLOBIN A1C    Screening for deficiency anemia        Relevant Orders    CBC WITH DIFFERENTIAL    Elevated TSH        Relevant Orders    TSH WITH REFLEX TO FT4          Follow-up: PRN concerns and for annual screenings once per year    -Smoking cessation discussed if smoking and encouraged to come to office if quit and tempted or restarts smoking if pertinent to this patient. We discourage use of electronic smoking devises.   -Discussed healthy drinking habits if over 7 for females, 14 for males per week or more than 4 in one day.  -Discussed healthy eating habits, exercise, being physically active, healthy bmi below 25  -patient to provide ages of family members when they were diagnosed with cancer , especially breast and ovarian, pancreatic cancers.  -Reviewed pap history in female patients, if they have a gynecologist they are encouraged to follow up with that doctor for annual pelvic and breast exams. If they prefer to see me for women's health, I will perform pap smear with hpv dna testing, pelvic, and breast exam, these and male genital exams are always done in the presence of a medical assistant and with verbal permission from the patient.   -Colonoscopy history reviewed with those over 46yo or those with early family h/o colon cancer. If patient is  due we provide them with various colon cancer screen modalities and relevant information to help the patient decide which is best for them.   -Mammograms recommended yearly for women over 39yo. Risks and benefits are reviewed and discussed with the patient and mammogram script provided.   -PSA discussed with males with family history of prostate cancer or those concerned. The decision to order this test is made with the patient.   -If patient prescribed medicines then told to review package insert for any warnings, side effects, contra-indications and medication vs medication reactions.   -STD testing added to lab work due to patients age per guideline recommendations  -Patients screened for anxiety and depression. If positive screening patients are offered behavioral health services, medications, and tools to improve mood.   -to improve bone health take calcium and vitamin D, perform weight-bearing exercise, in addition we can discuss additional medications if needed including bisphosphonates, parathyroid hormone, and raloxifene.  Esophageal irritation can occur with bisphosphonate therapy this can be reduced by not laying down for 30 min after taking and taking with a full glass of water  -if wearing nail polish on toes or hands asked to rto if there are any dark brown or black areas under the nails  If lab tests ordered, then patient instructed to go to lab/location/plan  If imaging tests ordered, then patient instructed to go to radiology/location/plan  If medicines ordered, then patient instructed to go to pharmacy/location/plan  Health maintenance I reviewed both men and women's health maintenance  Leading causes of death are motor vehicle accidents, cardiovascular disease, malignant tumors, and HIV.   Breast and ovarian cancer mutation screening was suggested if there was an increased risk for the patient based on [place] scoring.   -A general visit to see the eye doctor every one to two years was thought  appropriate. Dentist ever 6-12 months.  -Immunization suggestions: Tetanus shot every 10 years, Influenza immunization pneumococcal- anyone with chronic illnesses

## 2022-05-24 ENCOUNTER — HOSPITAL ENCOUNTER (OUTPATIENT)
Dept: LAB | Facility: MEDICAL CENTER | Age: 27
End: 2022-05-24
Attending: FAMILY MEDICINE
Payer: COMMERCIAL

## 2022-05-24 DIAGNOSIS — Z13.1 SCREENING FOR DIABETES MELLITUS (DM): ICD-10-CM

## 2022-05-24 DIAGNOSIS — Z13.0 SCREENING FOR DEFICIENCY ANEMIA: ICD-10-CM

## 2022-05-24 DIAGNOSIS — R79.89 ELEVATED TSH: ICD-10-CM

## 2022-05-24 DIAGNOSIS — Z13.220 SCREENING FOR HYPERLIPIDEMIA: ICD-10-CM

## 2022-05-24 LAB
ALBUMIN SERPL BCP-MCNC: 4.7 G/DL (ref 3.2–4.9)
ALBUMIN/GLOB SERPL: 2.1 G/DL
ALP SERPL-CCNC: 48 U/L (ref 30–99)
ALT SERPL-CCNC: 12 U/L (ref 2–50)
ANION GAP SERPL CALC-SCNC: 9 MMOL/L (ref 7–16)
AST SERPL-CCNC: 17 U/L (ref 12–45)
BASOPHILS # BLD AUTO: 0.8 % (ref 0–1.8)
BASOPHILS # BLD: 0.05 K/UL (ref 0–0.12)
BILIRUB SERPL-MCNC: 2.2 MG/DL (ref 0.1–1.5)
BUN SERPL-MCNC: 9 MG/DL (ref 8–22)
CALCIUM SERPL-MCNC: 9.1 MG/DL (ref 8.5–10.5)
CHLORIDE SERPL-SCNC: 105 MMOL/L (ref 96–112)
CHOLEST SERPL-MCNC: 162 MG/DL (ref 100–199)
CO2 SERPL-SCNC: 23 MMOL/L (ref 20–33)
CREAT SERPL-MCNC: 0.59 MG/DL (ref 0.5–1.4)
EOSINOPHIL # BLD AUTO: 0.08 K/UL (ref 0–0.51)
EOSINOPHIL NFR BLD: 1.3 % (ref 0–6.9)
ERYTHROCYTE [DISTWIDTH] IN BLOOD BY AUTOMATED COUNT: 43.4 FL (ref 35.9–50)
EST. AVERAGE GLUCOSE BLD GHB EST-MCNC: 91 MG/DL
GFR SERPLBLD CREATININE-BSD FMLA CKD-EPI: 125 ML/MIN/1.73 M 2
GLOBULIN SER CALC-MCNC: 2.2 G/DL (ref 1.9–3.5)
GLUCOSE SERPL-MCNC: 88 MG/DL (ref 65–99)
HBA1C MFR BLD: 4.8 % (ref 4–5.6)
HCT VFR BLD AUTO: 40.9 % (ref 37–47)
HDLC SERPL-MCNC: 58 MG/DL
HGB BLD-MCNC: 13.7 G/DL (ref 12–16)
IMM GRANULOCYTES # BLD AUTO: 0.01 K/UL (ref 0–0.11)
IMM GRANULOCYTES NFR BLD AUTO: 0.2 % (ref 0–0.9)
LDLC SERPL CALC-MCNC: 93 MG/DL
LYMPHOCYTES # BLD AUTO: 1.7 K/UL (ref 1–4.8)
LYMPHOCYTES NFR BLD: 28.4 % (ref 22–41)
MCH RBC QN AUTO: 31.9 PG (ref 27–33)
MCHC RBC AUTO-ENTMCNC: 33.5 G/DL (ref 33.6–35)
MCV RBC AUTO: 95.3 FL (ref 81.4–97.8)
MONOCYTES # BLD AUTO: 0.53 K/UL (ref 0–0.85)
MONOCYTES NFR BLD AUTO: 8.8 % (ref 0–13.4)
NEUTROPHILS # BLD AUTO: 3.62 K/UL (ref 2–7.15)
NEUTROPHILS NFR BLD: 60.5 % (ref 44–72)
NRBC # BLD AUTO: 0 K/UL
NRBC BLD-RTO: 0 /100 WBC
PLATELET # BLD AUTO: 294 K/UL (ref 164–446)
PMV BLD AUTO: 11.5 FL (ref 9–12.9)
POTASSIUM SERPL-SCNC: 4.5 MMOL/L (ref 3.6–5.5)
PROT SERPL-MCNC: 6.9 G/DL (ref 6–8.2)
RBC # BLD AUTO: 4.29 M/UL (ref 4.2–5.4)
SODIUM SERPL-SCNC: 137 MMOL/L (ref 135–145)
TRIGL SERPL-MCNC: 57 MG/DL (ref 0–149)
TSH SERPL DL<=0.005 MIU/L-ACNC: 2.08 UIU/ML (ref 0.38–5.33)
WBC # BLD AUTO: 6 K/UL (ref 4.8–10.8)

## 2022-05-24 PROCEDURE — 36415 COLL VENOUS BLD VENIPUNCTURE: CPT

## 2022-05-24 PROCEDURE — 84443 ASSAY THYROID STIM HORMONE: CPT

## 2022-05-24 PROCEDURE — 85025 COMPLETE CBC W/AUTO DIFF WBC: CPT

## 2022-05-24 PROCEDURE — 80061 LIPID PANEL: CPT

## 2022-05-24 PROCEDURE — 80053 COMPREHEN METABOLIC PANEL: CPT

## 2022-05-24 PROCEDURE — 83036 HEMOGLOBIN GLYCOSYLATED A1C: CPT

## 2022-05-31 ENCOUNTER — OFFICE VISIT (OUTPATIENT)
Dept: MEDICAL GROUP | Facility: IMAGING CENTER | Age: 27
End: 2022-05-31
Payer: COMMERCIAL

## 2022-05-31 VITALS
SYSTOLIC BLOOD PRESSURE: 112 MMHG | TEMPERATURE: 99.5 F | BODY MASS INDEX: 26.2 KG/M2 | HEART RATE: 64 BPM | HEIGHT: 66 IN | OXYGEN SATURATION: 97 % | WEIGHT: 163 LBS | DIASTOLIC BLOOD PRESSURE: 58 MMHG

## 2022-05-31 DIAGNOSIS — Z13.79 GENETIC TESTING: ICD-10-CM

## 2022-05-31 DIAGNOSIS — B35.1 ONYCHOMYCOSIS: ICD-10-CM

## 2022-05-31 PROBLEM — E03.9 ACQUIRED HYPOTHYROIDISM: Status: RESOLVED | Noted: 2020-11-30 | Resolved: 2022-05-31

## 2022-05-31 PROCEDURE — 99214 OFFICE O/P EST MOD 30 MIN: CPT | Performed by: FAMILY MEDICINE

## 2022-05-31 RX ORDER — CICLOPIROX 80 MG/ML
SOLUTION TOPICAL
Qty: 6.6 ML | Refills: 0 | Status: SHIPPED | OUTPATIENT
Start: 2022-05-31

## 2022-05-31 ASSESSMENT — PAIN SCALES - GENERAL: PAINLEVEL: NO PAIN

## 2022-05-31 ASSESSMENT — FIBROSIS 4 INDEX: FIB4 SCORE: 0.48

## 2022-05-31 NOTE — PROGRESS NOTES
"Chief Complaint   Patient presents with   • Nail Problem     HPI:   With a history of   Patient Active Problem List   Diagnosis   • Gilbert's disease   • Onychomycosis   Here for follow-up after annual exam review labs.    Patient also has a concern about both of her great toes which she has polish on today but reports that they are changing the shape and have brown and then and are getting thicker.    stoppped thyroid medication 2022.  She denies any symptoms such as palpitations changes in bowel habits skin or hair changes.  Does report weight gain and or not much weight loss since working out pretty consistently 2-3 times per week for the last 6 months.      She reports that she has hep a and hep b vaccines.   She is an environmental enginer    She reports that she recently lost a friend to breast cancer.  The friend was 29 or 30 years old.  And  within a year of being diagnosed.  Patient is anxious about this fate for herself.  She does not have any family history of breast cancer.  Patient denies any discharge from the nipples.  Patient also denies any dimpling or changes in the breast or masses noted.  No Known Allergies  Current Outpatient Medications   Medication Sig Dispense Refill   • ciclopirox (PENLAC) 8 % solution Apply to affected toes nightly for 8 months 6.6 mL 0     No current facility-administered medications for this visit.     Social History     Tobacco Use   • Smoking status: Never Smoker   • Smokeless tobacco: Never Used   Vaping Use   • Vaping Use: Never used   Substance Use Topics   • Alcohol use: Yes   • Drug use: Never     Family History   Problem Relation Age of Onset   • Thyroid Mother    • Heart Disease Mother    • Hypertension Father    • Diabetes Father        /58 (BP Location: Left arm, Patient Position: Sitting, BP Cuff Size: Adult)   Pulse 64   Temp 37.5 °C (99.5 °F) (Temporal)   Ht 1.676 m (5' 6\")   Wt 73.9 kg (163 lb)   SpO2 97%  Body mass index is 26.31 " kg/m².  Review of Systems   Constitutional: Negative for chills, fever and malaise/fatigue.   HENT: Negative for hearing loss and tinnitus.    Eyes: Negative for double vision and pain.   Respiratory: Negative for cough, shortness of breath and wheezing.    Cardiovascular: Negative for chest pain, palpitations and leg swelling.   Gastrointestinal: Negative for abdominal pain, diarrhea, nausea and vomiting.   Genitourinary: Negative for dysuria and frequency.   Musculoskeletal: Negative for joint pain and myalgias.   Skin: Negative for itching and rash.   Neurological: Negative for dizziness and headaches.     Physical Exam  Constitutional:       General: She is not in acute distress.     Appearance: She is not diaphoretic.   HENT:      Head: Normocephalic and atraumatic.   Eyes:      General: No scleral icterus.        Right eye: No discharge.         Left eye: No discharge.      Conjunctiva/sclera: Conjunctivae normal.      Pupils: Pupils are equal, round, and reactive to light.   Neck:      Thyroid: No thyromegaly.   Pulmonary:      Effort: Pulmonary effort is normal. No respiratory distress.      Comments: Lateral breast without dimpling.  No masses noted bilaterally.  No adenopathy bilaterally.  No rashes bilaterally.  Abdominal:      General: There is no distension.   Skin:     General: Skin is warm and dry.   Neurological:      Mental Status: She is alert.   Psychiatric:         Mood and Affect: Affect normal.         Judgment: Judgment normal.               All labs (last 1 month):   Hospital Outpatient Visit on 05/24/2022   Component Date Value Ref Range Status   • TSH 05/24/2022 2.080  0.380 - 5.330 uIU/mL Final    Comment: Reference Range:    Pregnant Females, 1st Trimester  0.050-3.700  Pregnant Females, 2nd Trimester  0.310-4.350  Pregnant Females, 3rd Trimester  0.410-5.180     • Glycohemoglobin 05/24/2022 4.8  4.0 - 5.6 % Final    Comment: Increased risk for diabetes:  5.7 -6.4%  Diabetes:   >6.4%  Glycemic control for adults with diabetes:  <7.0%    The above interpretations are per ADA guidelines.  Diagnosis  of diabetes mellitus on the basis of elevated Hemoglobin A1c  should be confirmed by repeating the Hb A1c test.     • Est Avg Glucose 05/24/2022 91  mg/dL Final    Comment: The eAG calculation is based on the A1c-Derived Daily Glucose  (ADAG) study.  See the ADA's website for additional information.     • Sodium 05/24/2022 137  135 - 145 mmol/L Final   • Potassium 05/24/2022 4.5  3.6 - 5.5 mmol/L Final   • Chloride 05/24/2022 105  96 - 112 mmol/L Final   • Co2 05/24/2022 23  20 - 33 mmol/L Final   • Anion Gap 05/24/2022 9.0  7.0 - 16.0 Final   • Glucose 05/24/2022 88  65 - 99 mg/dL Final   • Bun 05/24/2022 9  8 - 22 mg/dL Final   • Creatinine 05/24/2022 0.59  0.50 - 1.40 mg/dL Final   • Calcium 05/24/2022 9.1  8.5 - 10.5 mg/dL Final   • AST(SGOT) 05/24/2022 17  12 - 45 U/L Final   • ALT(SGPT) 05/24/2022 12  2 - 50 U/L Final   • Alkaline Phosphatase 05/24/2022 48  30 - 99 U/L Final   • Total Bilirubin 05/24/2022 2.2 (A) 0.1 - 1.5 mg/dL Final    Comment: The icterus index of the specimen exceeds the allowed tolerance for the  test.  Result may be affected.     • Albumin 05/24/2022 4.7  3.2 - 4.9 g/dL Final   • Total Protein 05/24/2022 6.9  6.0 - 8.2 g/dL Final   • Globulin 05/24/2022 2.2  1.9 - 3.5 g/dL Final   • A-G Ratio 05/24/2022 2.1  g/dL Final   • WBC 05/24/2022 6.0  4.8 - 10.8 K/uL Final   • RBC 05/24/2022 4.29  4.20 - 5.40 M/uL Final   • Hemoglobin 05/24/2022 13.7  12.0 - 16.0 g/dL Final   • Hematocrit 05/24/2022 40.9  37.0 - 47.0 % Final   • MCV 05/24/2022 95.3  81.4 - 97.8 fL Final   • MCH 05/24/2022 31.9  27.0 - 33.0 pg Final   • MCHC 05/24/2022 33.5 (A) 33.6 - 35.0 g/dL Final   • RDW 05/24/2022 43.4  35.9 - 50.0 fL Final   • Platelet Count 05/24/2022 294  164 - 446 K/uL Final   • MPV 05/24/2022 11.5  9.0 - 12.9 fL Final   • Neutrophils-Polys 05/24/2022 60.50  44.00 - 72.00 % Final   •  Lymphocytes 05/24/2022 28.40  22.00 - 41.00 % Final   • Monocytes 05/24/2022 8.80  0.00 - 13.40 % Final   • Eosinophils 05/24/2022 1.30  0.00 - 6.90 % Final   • Basophils 05/24/2022 0.80  0.00 - 1.80 % Final   • Immature Granulocytes 05/24/2022 0.20  0.00 - 0.90 % Final   • Nucleated RBC 05/24/2022 0.00  /100 WBC Final   • Neutrophils (Absolute) 05/24/2022 3.62  2.00 - 7.15 K/uL Final    Includes immature neutrophils, if present.   • Lymphs (Absolute) 05/24/2022 1.70  1.00 - 4.80 K/uL Final   • Monos (Absolute) 05/24/2022 0.53  0.00 - 0.85 K/uL Final   • Eos (Absolute) 05/24/2022 0.08  0.00 - 0.51 K/uL Final   • Baso (Absolute) 05/24/2022 0.05  0.00 - 0.12 K/uL Final   • Immature Granulocytes (abs) 05/24/2022 0.01  0.00 - 0.11 K/uL Final   • NRBC (Absolute) 05/24/2022 0.00  K/uL Final   • Cholesterol,Tot 05/24/2022 162  100 - 199 mg/dL Final   • Triglycerides 05/24/2022 57  0 - 149 mg/dL Final   • HDL 05/24/2022 58  >=40 mg/dL Final   • LDL 05/24/2022 93  <100 mg/dL Final   • GFR (CKD-EPI) 05/24/2022 125  >60 mL/min/1.73 m 2 Final    Comment: Effective 3/15/2022, estimated Glomerular Filtration Rate  is calculated using race neutral CKD-EPI 2021 equation  per [facility]-ASN recommendations.         Lipids:   Lab Results   Component Value Date/Time    CHOLSTRLTOT 162 05/24/2022 09:57 AM    TRIGLYCERIDE 57 05/24/2022 09:57 AM    HDL 58 05/24/2022 09:57 AM    LDL 93 05/24/2022 09:57 AM       Imaging: No results found.    A/P  Prefers topical treatment for nails.   Discussed at home breast exams.  Will send for genetic testing.  Tdap at follow-up  Patient reports that she would prefer to get her Pap smear here in our office.  Patient will schedule an appointment for Pap smear  Labs reviewed and normal  Return pap smear.    Problem List Items Addressed This Visit     Onychomycosis    Relevant Medications    ciclopirox (PENLAC) 8 % solution      Other Visit Diagnoses     Genetic testing        Relevant Orders    Referral to  Genetic Research Studies          Portions of this note may be dictated using Dragon NaturallySpeaking voice recognition software.  Variances in spelling and vocabulary are possible and unintentional.  Not all areas may be caught/corrected.  Please notify me if any discrepancies are noted or if the meaning of any statement is not correct/clear.

## 2022-06-02 ENCOUNTER — RESEARCH ENCOUNTER (OUTPATIENT)
Dept: RESEARCH | Facility: WORKSITE | Age: 27
End: 2022-06-02
Attending: FAMILY MEDICINE
Payer: COMMERCIAL

## 2022-06-02 DIAGNOSIS — Z00.6 RESEARCH STUDY PATIENT: ICD-10-CM

## 2022-07-07 LAB
APOB+LDLR+PCSK9 GENE MUT ANL BLD/T: NOT DETECTED
BRCA1+BRCA2 DEL+DUP + FULL MUT ANL BLD/T: NOT DETECTED
MLH1+MSH2+MSH6+PMS2 GN DEL+DUP+FUL M: NOT DETECTED

## 2024-04-26 ENCOUNTER — HOSPITAL ENCOUNTER (OUTPATIENT)
Facility: MEDICAL CENTER | Age: 29
End: 2024-04-26
Attending: NURSE PRACTITIONER
Payer: COMMERCIAL

## 2024-04-26 ENCOUNTER — OFFICE VISIT (OUTPATIENT)
Dept: OBGYN | Facility: CLINIC | Age: 29
End: 2024-04-26
Payer: COMMERCIAL

## 2024-04-26 VITALS
BODY MASS INDEX: 23.5 KG/M2 | DIASTOLIC BLOOD PRESSURE: 84 MMHG | WEIGHT: 146.2 LBS | SYSTOLIC BLOOD PRESSURE: 128 MMHG | HEIGHT: 66 IN

## 2024-04-26 DIAGNOSIS — Z12.4 ENCOUNTER FOR PAPANICOLAOU SMEAR FOR CERVICAL CANCER SCREENING: ICD-10-CM

## 2024-04-26 DIAGNOSIS — Z01.419 WELL WOMAN EXAM WITH ROUTINE GYNECOLOGICAL EXAM: ICD-10-CM

## 2024-04-26 DIAGNOSIS — Z11.51 SCREENING FOR HPV (HUMAN PAPILLOMAVIRUS): ICD-10-CM

## 2024-04-26 DIAGNOSIS — Z30.431 IUD CHECK UP: ICD-10-CM

## 2024-04-26 DIAGNOSIS — N92.3 INTERMENSTRUAL SPOTTING: ICD-10-CM

## 2024-04-26 DIAGNOSIS — Z80.3 FAMILY HISTORY OF BREAST CANCER: ICD-10-CM

## 2024-04-26 PROCEDURE — 88175 CYTOPATH C/V AUTO FLUID REDO: CPT

## 2024-04-26 ASSESSMENT — FIBROSIS 4 INDEX: FIB4 SCORE: 0.52

## 2024-04-26 NOTE — PROGRESS NOTES
Patient here for annual exam.     LMP : 04/22/24  BCM : IUD Paragard inserted May 2018    Pt states about 15 days after her period she was experiencing period cramps with some discharge. Lasted 28 days     Pt c/o irregular periods. She will be doing her PAP today.  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

## 2024-04-26 NOTE — PROGRESS NOTES
"ANNUAL GYNECOLOGY VISIT    Chief Complaint  Gynecologic Exam and Menstrual Problem      HPI:  Subjective  Katelyn Ling is a 29 y.o. female . Patient is from [place]. Patient's last menstrual period was 2024 (exact date). Using Paragard (Copper) IUD for contraception who presents today for Annual Exam. Patient complains of intermenstrual spotting and cramping this month on day 15 () of her cycle with \"reddish mucous\" discharge. Pregnancy test was negative. On day 21 () had another episode of cramping and discharge, and on day 28 had her regular period.  This has not happened before. Her periods tend to be very regular lasting between 3 to 6 days.  Patient has a family history of breast cancer and is interested in genetic testing. She otherwise denies any new health problems.       Preventive Care   Immunization History   Administered Date(s) Administered    Hepatitis B Vaccine (Adol/Adult) 10/05/2021, 2021, 2022    PFIZER PURPLE CAP SARS-COV-2 VACCINATION (12+) 2021, 2021, 2022       Gardasil HPV vaccine: Completed 3 doses in 2018 in [place]     Gynecology History and ROS  Current Sexual Activity: yes - male monogamous   History of sexually transmitted diseases? no  Abnormal discharge? no  Current Contraception:  Paragard placed in 2018 in [place].     Menstrual History  Patient's last menstrual period was 2024 (exact date).  Periods are regular  q 27-29 days, lasting 3-6 days, but gets very light to spotting after day 3.   Clots or heavy flow: yes - first 3 days  Dysmenorrhea: yes - moderate every other cycle  Intermenstrual bleeding/spotting: yes - just this month in April  Significant pain with periods:no  Bothersome PMS symptoms: yes - whitman or emotional   Significant Pelvic Pain: no    Pap History  Last pap smear: Unknown  History of moderate or severe dysplasia: no    Cancer Risk Assessement:  Family history of:   - Breast cancer: " "yes, maternal cousin   - Ovarian cancer: no   - Uterine cancer: no   - Colon cancer: no    Obstetric History  OB History    Para Term  AB Living   0 0 0 0 0 0   SAB IAB Ectopic Molar Multiple Live Births   0 0 0 0 0 0       Past Medical History  Past Medical History:   Diagnosis Date    Gilbert's disease        Past Surgical History  History reviewed. No pertinent surgical history.    Social History  Social History     Tobacco Use    Smoking status: Never    Smokeless tobacco: Never   Vaping Use    Vaping Use: Never used   Substance Use Topics    Alcohol use: Yes    Drug use: Never        Family History  Family History   Problem Relation Age of Onset    Thyroid Mother     Heart Disease Mother     Hypertension Father     Diabetes Father        Home Medications  Current Outpatient Medications   Medication Sig    ciclopirox (PENLAC) 8 % solution Apply to affected toes nightly for 8 months       Allergies/Reactions  No Known Allergies    ROS  Gen: no fevers or chills, no significant weight loss or gain, excessive fatigue  Respiratory:  no cough or dyspnea  Cardiac:  no chest pain, no palpitations, no syncope  Breast: no breast discharge, pain, lump or skin changes  GI:  no heartburn, no abdominal pain, no nausea or vomiting  Urinary: no dysuria, urgency, frequency, incontinence   Psych: no depression or anxiety  Neuro: no migraines with aura, fainting spells, numbness or tingling  Extremities: no joint pain, persistently swollen ankles, recurrent leg cramps      Physical Examination:  Vital Signs: /84 (BP Location: Left arm, Patient Position: Sitting, BP Cuff Size: Adult)   Ht 5' 6\"   Wt 146 lb 3.2 oz   LMP 2024 (Exact Date)   BMI 23.60 kg/m²       Constitutional: The patient is well developed and well nourished.  Psychiatric: Patient is oriented to time place and person.   Skin: No rash observed.  Neck: Appears symmetric. Thyroid normal size  Respiratory: normal effort, no rales, rhonchi " or wheezes bilaterally. Clear to ausculation bilaterally.   Heart auscultation: no murmur rub or gallop, RRR  Breast: Inspection reveals no asymetry or nipple discharge, no skin thickening, dimpling or erythema.  Palpation demonstrates no masses.  Abdomen: Soft, non-tender. BS x 4.   Pelvic Exam:      Vulva: external female genitalia are normal in appearance. No lesions     Urethra - no lesions, no erythema     Vagina: moist, pink, normal ruggae     Cervix: pink, smooth, no lesions, no CMT, Nabothian cysts noted, IUD strings visualized     Uterus - non-tender, normal size, shape, contour, mobile, anteverted     Ovaries: non-tender, no appreciable masses    Pap Smear performed: Yes    Chaperone Present: Emeli Paez MA  Extremeties: Legs are symmetric and without tenderness. There is no edema present.        Assessment & Plan  Katelyn Ling is a 29 y.o. female who presents today for Annual Gyn Exam. See HPI for additional details.       1. Well woman exam with routine gynecological exam  Encouraged adequate water intake, healthy diet, regular exercise. Educated on Pap smear screening and guidelines for age per ACOG and ASCCP. Discussed safe sex, STI prevention, contraception/family planning. Self breast exam taught.     2. Encounter for Papanicolaou smear for cervical cancer screening  - THINPREP PAP W/HPV AND CTNG  I will follow up with patient once results are available and guide treatment if indicated per ASCCP guidelines.    3. Screening for HPV (human papillomavirus)  - THINPREP PAP W/HPV AND CTNG    4. Family history of breast cancer  - Referral to Genetic Research Studies    5. Intermenstrual spotting  - US-PELVIC COMPLETE (TRANSABDOMINAL/TRANSVAGINAL) (COMBO); Future  Will message patient with results when available. Intermenstrual spotting possibly secondary to hormone fluctuation. Advised to continue to monitor and should patient experience 1-2 more episodes could consider replacing  Paragard IUD with new one. Patient is not interested in other IUDs or forms of BC.     6. IUD check up  - US-PELVIC COMPLETE (TRANSABDOMINAL/TRANSVAGINAL) (COMBO); Future        Return: Annually or PRN    Reyna Artis A.P.R.N.  4/26/2024    This dictation was created using voice recognition software. The accuracy of the dictation is limited to the abilities of the software. I expect there may be some errors of grammar and possibly content.

## 2024-05-01 LAB
C TRACH RRNA CVX QL NAA+PROBE: NEGATIVE
CYTOLOGIST CVX/VAG CYTO: NORMAL
CYTOLOGY CVX/VAG DOC CYTO: NORMAL
CYTOLOGY CVX/VAG DOC THIN PREP: NORMAL
HPV I/H RISK 4 DNA CVX QL PROBE+SIG AMP: NEGATIVE
N GONORRHOEA RRNA CVX QL NAA+PROBE: NEGATIVE
NOTE NL11727A: NORMAL
OTHER STN SPEC: NORMAL
STAT OF ADQ CVX/VAG CYTO-IMP: NORMAL

## 2024-06-07 ENCOUNTER — HOSPITAL ENCOUNTER (OUTPATIENT)
Dept: RADIOLOGY | Facility: MEDICAL CENTER | Age: 29
End: 2024-06-07
Attending: NURSE PRACTITIONER
Payer: COMMERCIAL

## 2024-06-07 DIAGNOSIS — N92.3 INTERMENSTRUAL SPOTTING: ICD-10-CM

## 2024-06-07 DIAGNOSIS — Z30.431 IUD CHECK UP: ICD-10-CM

## 2024-06-07 PROCEDURE — 76830 TRANSVAGINAL US NON-OB: CPT

## 2024-06-14 SDOH — ECONOMIC STABILITY: INCOME INSECURITY: IN THE LAST 12 MONTHS, WAS THERE A TIME WHEN YOU WERE NOT ABLE TO PAY THE MORTGAGE OR RENT ON TIME?: NO

## 2024-06-14 SDOH — ECONOMIC STABILITY: FOOD INSECURITY: WITHIN THE PAST 12 MONTHS, THE FOOD YOU BOUGHT JUST DIDN'T LAST AND YOU DIDN'T HAVE MONEY TO GET MORE.: NEVER TRUE

## 2024-06-14 SDOH — ECONOMIC STABILITY: INCOME INSECURITY: HOW HARD IS IT FOR YOU TO PAY FOR THE VERY BASICS LIKE FOOD, HOUSING, MEDICAL CARE, AND HEATING?: NOT VERY HARD

## 2024-06-14 SDOH — ECONOMIC STABILITY: FOOD INSECURITY: WITHIN THE PAST 12 MONTHS, YOU WORRIED THAT YOUR FOOD WOULD RUN OUT BEFORE YOU GOT MONEY TO BUY MORE.: NEVER TRUE

## 2024-06-14 SDOH — HEALTH STABILITY: PHYSICAL HEALTH: ON AVERAGE, HOW MANY DAYS PER WEEK DO YOU ENGAGE IN MODERATE TO STRENUOUS EXERCISE (LIKE A BRISK WALK)?: 3 DAYS

## 2024-06-14 SDOH — HEALTH STABILITY: PHYSICAL HEALTH: ON AVERAGE, HOW MANY MINUTES DO YOU ENGAGE IN EXERCISE AT THIS LEVEL?: 60 MIN

## 2024-06-14 SDOH — HEALTH STABILITY: MENTAL HEALTH
STRESS IS WHEN SOMEONE FEELS TENSE, NERVOUS, ANXIOUS, OR CAN'T SLEEP AT NIGHT BECAUSE THEIR MIND IS TROUBLED. HOW STRESSED ARE YOU?: ONLY A LITTLE

## 2024-06-14 SDOH — ECONOMIC STABILITY: HOUSING INSECURITY: IN THE LAST 12 MONTHS, HOW MANY PLACES HAVE YOU LIVED?: 1

## 2024-06-14 ASSESSMENT — SOCIAL DETERMINANTS OF HEALTH (SDOH)
IN A TYPICAL WEEK, HOW MANY TIMES DO YOU TALK ON THE PHONE WITH FAMILY, FRIENDS, OR NEIGHBORS?: MORE THAN THREE TIMES A WEEK
IN A TYPICAL WEEK, HOW MANY TIMES DO YOU TALK ON THE PHONE WITH FAMILY, FRIENDS, OR NEIGHBORS?: MORE THAN THREE TIMES A WEEK
HOW OFTEN DO YOU GET TOGETHER WITH FRIENDS OR RELATIVES?: ONCE A WEEK
HOW OFTEN DO YOU ATTENT MEETINGS OF THE CLUB OR ORGANIZATION YOU BELONG TO?: NEVER
HOW OFTEN DO YOU HAVE SIX OR MORE DRINKS ON ONE OCCASION: NEVER
WITHIN THE PAST 12 MONTHS, YOU WORRIED THAT YOUR FOOD WOULD RUN OUT BEFORE YOU GOT THE MONEY TO BUY MORE: NEVER TRUE
HOW OFTEN DO YOU ATTEND CHURCH OR RELIGIOUS SERVICES?: MORE THAN 4 TIMES PER YEAR
DO YOU BELONG TO ANY CLUBS OR ORGANIZATIONS SUCH AS CHURCH GROUPS UNIONS, FRATERNAL OR ATHLETIC GROUPS, OR SCHOOL GROUPS?: NO
HOW OFTEN DO YOU ATTEND CHURCH OR RELIGIOUS SERVICES?: MORE THAN 4 TIMES PER YEAR
HOW MANY DRINKS CONTAINING ALCOHOL DO YOU HAVE ON A TYPICAL DAY WHEN YOU ARE DRINKING: 1 OR 2
HOW OFTEN DO YOU GET TOGETHER WITH FRIENDS OR RELATIVES?: ONCE A WEEK
DO YOU BELONG TO ANY CLUBS OR ORGANIZATIONS SUCH AS CHURCH GROUPS UNIONS, FRATERNAL OR ATHLETIC GROUPS, OR SCHOOL GROUPS?: NO
HOW OFTEN DO YOU HAVE A DRINK CONTAINING ALCOHOL: 2-4 TIMES A MONTH
IN THE PAST 12 MONTHS, HAS THE ELECTRIC, GAS, OIL, OR WATER COMPANY THREATENED TO SHUT OFF SERVICE IN YOUR HOME?: NO
HOW OFTEN DO YOU ATTENT MEETINGS OF THE CLUB OR ORGANIZATION YOU BELONG TO?: NEVER
HOW HARD IS IT FOR YOU TO PAY FOR THE VERY BASICS LIKE FOOD, HOUSING, MEDICAL CARE, AND HEATING?: NOT VERY HARD

## 2024-06-14 ASSESSMENT — LIFESTYLE VARIABLES
HOW OFTEN DO YOU HAVE A DRINK CONTAINING ALCOHOL: 2-4 TIMES A MONTH
HOW MANY STANDARD DRINKS CONTAINING ALCOHOL DO YOU HAVE ON A TYPICAL DAY: 1 OR 2
SKIP TO QUESTIONS 9-10: 1
AUDIT-C TOTAL SCORE: 2
HOW OFTEN DO YOU HAVE SIX OR MORE DRINKS ON ONE OCCASION: NEVER

## 2024-06-17 ENCOUNTER — OFFICE VISIT (OUTPATIENT)
Dept: MEDICAL GROUP | Facility: MEDICAL CENTER | Age: 29
End: 2024-06-17
Payer: COMMERCIAL

## 2024-06-17 VITALS
SYSTOLIC BLOOD PRESSURE: 120 MMHG | HEIGHT: 66 IN | HEART RATE: 62 BPM | WEIGHT: 157 LBS | OXYGEN SATURATION: 97 % | RESPIRATION RATE: 16 BRPM | BODY MASS INDEX: 25.23 KG/M2 | DIASTOLIC BLOOD PRESSURE: 68 MMHG | TEMPERATURE: 97.4 F

## 2024-06-17 DIAGNOSIS — B35.1 ONYCHOMYCOSIS: ICD-10-CM

## 2024-06-17 DIAGNOSIS — Z97.5 IUD (INTRAUTERINE DEVICE) IN PLACE: ICD-10-CM

## 2024-06-17 DIAGNOSIS — E80.4 GILBERT'S DISEASE: ICD-10-CM

## 2024-06-17 DIAGNOSIS — E78.5 DYSLIPIDEMIA: ICD-10-CM

## 2024-06-17 DIAGNOSIS — Z86.39 HISTORY OF THYROID DISORDER: ICD-10-CM

## 2024-06-17 DIAGNOSIS — Z76.89 ENCOUNTER TO ESTABLISH CARE: ICD-10-CM

## 2024-06-17 DIAGNOSIS — D22.9 ATYPICAL NEVI: ICD-10-CM

## 2024-06-17 DIAGNOSIS — Z86.69 HISTORY OF UVEITIS: ICD-10-CM

## 2024-06-17 PROCEDURE — 3074F SYST BP LT 130 MM HG: CPT | Performed by: NURSE PRACTITIONER

## 2024-06-17 PROCEDURE — 3078F DIAST BP <80 MM HG: CPT | Performed by: NURSE PRACTITIONER

## 2024-06-17 PROCEDURE — 99214 OFFICE O/P EST MOD 30 MIN: CPT | Performed by: NURSE PRACTITIONER

## 2024-06-17 RX ORDER — CICLOPIROX 80 MG/ML
SOLUTION TOPICAL
Qty: 6 ML | Refills: 11 | Status: SHIPPED | OUTPATIENT
Start: 2024-06-17

## 2024-06-17 ASSESSMENT — PATIENT HEALTH QUESTIONNAIRE - PHQ9: CLINICAL INTERPRETATION OF PHQ2 SCORE: 0

## 2024-06-17 NOTE — PROGRESS NOTES
"HPI    Katelyn Manny Ling is a 29 y.o. female here to Establish Care and   Chief Complaint   Patient presents with    Establish Care      Previous PCP : Dr. Corinne Yee    She has onychomycosis on bilateral great toenail.   Ongoing issue for some time.   Not interested in oral medications at this time.  Has not seen podiatry.  Would like to have a refill of topical Penlac solution.  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    Patient describes having several episodes of noninfectious conjunctivitis primarily in her right eye with the inferior portion only affected.  She describes having a pressure just underneath her eye prior.  She reports that she saw her optometrist and was provided some sort of eyedrops.  Reports has had about 5 times in the last 2 years.  Again she was reassured it was not an infection.  She was \"diagnosed with dry eye \".  Denies any itching, discharge.  Essentially can resolve within 24 hours.  Patient not certain if it was diagnosed as uveitis.    She denies persistent back pain.    She does have a family history of lupus in paternal aunt.  Denies any concerning rashes or persistent joint pain.  History of having mildly elevated TSH and was tried on low-dose levothyroxine however self stopped at some point and TSH returned to within normal.  Mother with thyroid disease.    Patient also has a atypical mole behind her left pinna.    Chart reviewed  -Documented Gilbert's disease      ROS: SEE ABOVE        Current Outpatient Medications:     ciclopirox (PENLAC) 8 % solution, Apply to affected fingernail(s) and/or toenail(s) and adjacent skin once daily in combination with weekly nail trimming and periodic nail debridement. Remove with alcohol every 7 days; continue therapy until nail clearance (maximum duration: 48 weeks), Disp: 6 mL, Rfl: 11    No Known Allergies    Past Medical History:   Diagnosis Date    Anemia     GERD (gastroesophageal reflux disease)     Gilbert's " disease     Hyperlipidemia     Thyroid disease     hx of subclincal hypo     History reviewed. No pertinent surgical history.  Family History   Problem Relation Age of Onset    Thyroid Mother     Heart Disease Mother     Diabetes Mother         Pre-diabetes    Hypertension Father     Diabetes Father         Pre-diabetes    Hyperlipidemia Father     Cancer Maternal Uncle      Social History     Socioeconomic History    Marital status:      Spouse name: Not on file    Number of children: Not on file    Years of education: Not on file    Highest education level: Bachelor's degree (e.g., BA, AB, BS)   Occupational History    Not on file   Tobacco Use    Smoking status: Never    Smokeless tobacco: Never   Vaping Use    Vaping status: Never Used   Substance and Sexual Activity    Alcohol use: Yes     Alcohol/week: 0.6 oz     Types: 1 Glasses of wine per week    Drug use: Never    Sexual activity: Yes     Partners: Male     Birth control/protection: I.U.D.   Other Topics Concern    Not on file   Social History Narrative    Not on file     Social Determinants of Health     Financial Resource Strain: Low Risk  (6/14/2024)    Overall Financial Resource Strain (CARDIA)     Difficulty of Paying Living Expenses: Not very hard   Food Insecurity: No Food Insecurity (6/14/2024)    Hunger Vital Sign     Worried About Running Out of Food in the Last Year: Never true     Ran Out of Food in the Last Year: Never true   Transportation Needs: No Transportation Needs (6/14/2024)    PRAPARE - Transportation     Lack of Transportation (Medical): No     Lack of Transportation (Non-Medical): No   Physical Activity: Sufficiently Active (6/14/2024)    Exercise Vital Sign     Days of Exercise per Week: 3 days     Minutes of Exercise per Session: 60 min   Stress: No Stress Concern Present (6/14/2024)    [employer] [place] [employer] - Occupational Stress Questionnaire     Feeling of Stress : Only a little   Social Connections:  "Moderately Integrated (2024)    Social Connection and Isolation Panel [NHANES]     Frequency of Communication with Friends and Family: More than three times a week     Frequency of Social Gatherings with Friends and Family: Once a week     Attends Je[employer] witness Services: More than 4 times per year     Active Member of Clubs or Organizations: No     Attends Club or Organization Meetings: Never     Marital Status:    Intimate Partner Violence: Not on file   Housing Stability: Low Risk  (2024)    Housing Stability Vital Sign     Unable to Pay for Housing in the Last Year: No     Number of Places Lived in the Last Year: 1     Unstable Housing in the Last Year: No       Objective:     Vitals: /68   Pulse 62   Temp 36.3 °C (97.4 °F)   Resp 16   Ht 1.676 m (5' 6\")   Wt 71.2 kg (157 lb)   SpO2 97%   BMI 25.34 kg/m²      General: Alert, pleasant, NAD  HEENT:  Normocephalic.  Neck supple.  No thyromegaly or masses palpated. No cervical or supraclavicular lymphadenopathy.  Heart:  Regular rate and rhythm.  S1 and S2 normal.  No murmurs appreciated.    Respiratory:  Normal respiratory effort.  Clear to auscultation bilaterally.    Skin:  Warm, dry, no rashes. Bilateral great toenail, discolored, slight thickened, curved  on lateral nail.  Atypical nevi behind left pinna.  Bilateral great toenail with discolored slightly thickened toenails.  Musculoskeletal:  Gait is normal.  Moves all extremities well.  Extremities:   No leg edema.  Neurological: No tremors  Psych:  Affect/mood is normal, judgement is good, memory is intact, grooming is appropriate.    Assessment and Plan.     29 y.o. female to establish care and discuss the followin. Onychomycosis  Longstanding problem for the patient.  Mild presentation upon observation.  Not interested in oral therapies at this time.  She would like to continue with topical products.  Consider podiatry referral if she would like.  - ciclopirox (PENLAC) 8 % " solution; Apply to affected fingernail(s) and/or toenail(s) and adjacent skin once daily in combination with weekly nail trimming and periodic nail debridement. Remove with alcohol every 7 days; continue therapy until nail clearance (maximum duration: 48 weeks)  Dispense: 6 mL; Refill: 11    2. Atypical nevi  Recommend consult with dermatology atypical nevi behind left pinna.  - Referral to Dermatology    3. History of thyroid disorder  Recommend updating thyroid labs including TPO antibodies.   TSH; Future  - FREE THYROXINE; Future  - THYROID PEROXIDASE  (TPO) AB; Future    4. IUD (intrauterine device) in place  Followed by GYN for this.    5. History of uveitis  ?  Possible although uncertain exact diagnosis at this time.  Patient does show me a video on her phone of her right eye and in fact the inferior portion of her sclera is erythematous without discharge, tearing noted.  Recommend continue to monitor and follow-up with optometry on a routine basis.  - LAKE IGG AJIT W/RFLX TO LAKE IGG IFA; Future  - CBC WITHOUT DIFFERENTIAL; Future    6. Dyslipidemia  - Lipid Profile; Future    7. Gilbert's disease  Present on chart review.  Monitor LFTs.  - Comp Metabolic Panel; Future    8. Encounter to establish care        Return for abnormal lab results. .    {I have placed the above orders and discussed them with an approved delegating provider.  The MA is performing the below orders under the direction of Dr. Sharad SNIDER

## 2024-06-18 PROBLEM — Z86.39 HISTORY OF THYROID DISORDER: Status: ACTIVE | Noted: 2024-06-18

## 2024-07-02 ENCOUNTER — HOSPITAL ENCOUNTER (OUTPATIENT)
Dept: LAB | Facility: MEDICAL CENTER | Age: 29
End: 2024-07-02
Attending: NURSE PRACTITIONER
Payer: COMMERCIAL

## 2024-07-02 DIAGNOSIS — Z86.39 HISTORY OF THYROID DISORDER: ICD-10-CM

## 2024-07-02 DIAGNOSIS — E80.4 GILBERT'S DISEASE: ICD-10-CM

## 2024-07-02 DIAGNOSIS — Z86.69 HISTORY OF UVEITIS: ICD-10-CM

## 2024-07-02 DIAGNOSIS — E78.5 DYSLIPIDEMIA: ICD-10-CM

## 2024-07-02 LAB
ALBUMIN SERPL BCP-MCNC: 4.5 G/DL (ref 3.2–4.9)
ALBUMIN/GLOB SERPL: 1.8 G/DL
ALP SERPL-CCNC: 54 U/L (ref 30–99)
ALT SERPL-CCNC: 14 U/L (ref 2–50)
ANION GAP SERPL CALC-SCNC: 11 MMOL/L (ref 7–16)
AST SERPL-CCNC: 17 U/L (ref 12–45)
BILIRUB SERPL-MCNC: 1.7 MG/DL (ref 0.1–1.5)
BUN SERPL-MCNC: 15 MG/DL (ref 8–22)
CALCIUM ALBUM COR SERPL-MCNC: 9 MG/DL (ref 8.5–10.5)
CALCIUM SERPL-MCNC: 9.4 MG/DL (ref 8.5–10.5)
CHLORIDE SERPL-SCNC: 104 MMOL/L (ref 96–112)
CHOLEST SERPL-MCNC: 175 MG/DL (ref 100–199)
CO2 SERPL-SCNC: 24 MMOL/L (ref 20–33)
CREAT SERPL-MCNC: 0.54 MG/DL (ref 0.5–1.4)
ERYTHROCYTE [DISTWIDTH] IN BLOOD BY AUTOMATED COUNT: 46.5 FL (ref 35.9–50)
GFR SERPLBLD CREATININE-BSD FMLA CKD-EPI: 126 ML/MIN/1.73 M 2
GLOBULIN SER CALC-MCNC: 2.5 G/DL (ref 1.9–3.5)
GLUCOSE SERPL-MCNC: 91 MG/DL (ref 65–99)
HCT VFR BLD AUTO: 39.8 % (ref 37–47)
HDLC SERPL-MCNC: 64 MG/DL
HGB BLD-MCNC: 13.2 G/DL (ref 12–16)
LDLC SERPL CALC-MCNC: 98 MG/DL
MCH RBC QN AUTO: 31.4 PG (ref 27–33)
MCHC RBC AUTO-ENTMCNC: 33.2 G/DL (ref 32.2–35.5)
MCV RBC AUTO: 94.5 FL (ref 81.4–97.8)
PLATELET # BLD AUTO: 310 K/UL (ref 164–446)
PMV BLD AUTO: 11.6 FL (ref 9–12.9)
POTASSIUM SERPL-SCNC: 4.4 MMOL/L (ref 3.6–5.5)
PROT SERPL-MCNC: 7 G/DL (ref 6–8.2)
RBC # BLD AUTO: 4.21 M/UL (ref 4.2–5.4)
SODIUM SERPL-SCNC: 139 MMOL/L (ref 135–145)
T4 FREE SERPL-MCNC: 1.31 NG/DL (ref 0.93–1.7)
THYROPEROXIDASE AB SERPL-ACNC: <9 IU/ML (ref 0–9)
TRIGL SERPL-MCNC: 66 MG/DL (ref 0–149)
TSH SERPL DL<=0.005 MIU/L-ACNC: 3.91 UIU/ML (ref 0.38–5.33)
WBC # BLD AUTO: 7.6 K/UL (ref 4.8–10.8)

## 2024-07-02 PROCEDURE — 80061 LIPID PANEL: CPT

## 2024-07-02 PROCEDURE — 86038 ANTINUCLEAR ANTIBODIES: CPT

## 2024-07-02 PROCEDURE — 86376 MICROSOMAL ANTIBODY EACH: CPT

## 2024-07-02 PROCEDURE — 86039 ANTINUCLEAR ANTIBODIES (ANA): CPT

## 2024-07-02 PROCEDURE — 85027 COMPLETE CBC AUTOMATED: CPT

## 2024-07-02 PROCEDURE — 84439 ASSAY OF FREE THYROXINE: CPT

## 2024-07-02 PROCEDURE — 80053 COMPREHEN METABOLIC PANEL: CPT

## 2024-07-02 PROCEDURE — 84443 ASSAY THYROID STIM HORMONE: CPT

## 2024-07-02 PROCEDURE — 36415 COLL VENOUS BLD VENIPUNCTURE: CPT

## 2024-07-03 LAB — NUCLEAR IGG SER QL IA: DETECTED

## 2024-07-05 LAB
ANA PAT SER IF-IMP: NORMAL
NUCLEAR IGG SER QL IF: NORMAL

## 2024-10-11 ENCOUNTER — APPOINTMENT (OUTPATIENT)
Dept: RADIOLOGY | Facility: MEDICAL CENTER | Age: 29
End: 2024-10-11
Attending: EMERGENCY MEDICINE
Payer: COMMERCIAL

## 2024-10-11 ENCOUNTER — HOSPITAL ENCOUNTER (EMERGENCY)
Facility: MEDICAL CENTER | Age: 29
End: 2024-10-11
Attending: EMERGENCY MEDICINE
Payer: COMMERCIAL

## 2024-10-11 VITALS
OXYGEN SATURATION: 98 % | HEIGHT: 66 IN | DIASTOLIC BLOOD PRESSURE: 58 MMHG | RESPIRATION RATE: 18 BRPM | SYSTOLIC BLOOD PRESSURE: 122 MMHG | WEIGHT: 152.78 LBS | TEMPERATURE: 98.1 F | HEART RATE: 76 BPM | BODY MASS INDEX: 24.55 KG/M2

## 2024-10-11 DIAGNOSIS — S09.90XA CLOSED HEAD INJURY, INITIAL ENCOUNTER: ICD-10-CM

## 2024-10-11 PROCEDURE — 70450 CT HEAD/BRAIN W/O DYE: CPT

## 2024-10-11 PROCEDURE — 99283 EMERGENCY DEPT VISIT LOW MDM: CPT

## 2024-10-11 ASSESSMENT — FIBROSIS 4 INDEX: FIB4 SCORE: 0.45
